# Patient Record
Sex: FEMALE | Race: BLACK OR AFRICAN AMERICAN | Employment: FULL TIME | URBAN - METROPOLITAN AREA
[De-identification: names, ages, dates, MRNs, and addresses within clinical notes are randomized per-mention and may not be internally consistent; named-entity substitution may affect disease eponyms.]

---

## 2018-07-18 ENCOUNTER — OFFICE VISIT (OUTPATIENT)
Dept: FAMILY MEDICINE CLINIC | Age: 23
End: 2018-07-18

## 2018-07-18 VITALS
OXYGEN SATURATION: 100 % | DIASTOLIC BLOOD PRESSURE: 75 MMHG | RESPIRATION RATE: 16 BRPM | TEMPERATURE: 97.7 F | BODY MASS INDEX: 19.12 KG/M2 | WEIGHT: 112 LBS | HEIGHT: 64 IN | SYSTOLIC BLOOD PRESSURE: 101 MMHG | HEART RATE: 78 BPM

## 2018-07-18 DIAGNOSIS — Z00.00 ROUTINE GENERAL MEDICAL EXAMINATION AT A HEALTH CARE FACILITY: Primary | ICD-10-CM

## 2018-07-18 NOTE — PROGRESS NOTES
Chief Complaint   Patient presents with    Physical         HPI:    This is a 21 y/o  patient who presents for routine physical. She want pertinent lab, screening test and immunizations for age. She has no unusual complain except for poor appetite- she confirms she works night and now in the habit of sleeping after work without eating. ROS: Pt denies: Wt loss, Fever/Chills, HA, Visual changes, Fatigue, Chest pain, SOB, MACHADO, Abd pain, N/V/D/C, Blood in stool or urine, Edema. Pertinent positive as above in HPI. All others were negative        No past medical history on file. Past Surgical History:   Procedure Laterality Date    VASCULAR SURGERY PROCEDURE UNLIST      Neck --lac repair as a baby       Family History   Problem Relation Age of Onset    No Known Problems Mother     No Known Problems Father     No Known Problems Sister     No Known Problems Brother     Diabetes Maternal Grandmother          Social History     Social History    Marital status: SINGLE     Spouse name: N/A    Number of children: N/A    Years of education: N/A     Occupational History    Not on file. Social History Main Topics    Smoking status: Never Smoker    Smokeless tobacco: Never Used    Alcohol use Yes      Comment: occasionally    Drug use: No    Sexual activity: Yes     Partners: Male     Birth control/ protection: IUD     Other Topics Concern    Not on file     Social History Narrative       Current Outpatient Prescriptions   Medication Sig Dispense Refill    ergocalciferol (ERGOCALCIFEROL) 50,000 unit capsule Take 1 Cap by mouth every seven (7) days.  12 Cap 1    cyclobenzaprine (FLEXERIL) 10 mg tablet   0         No Known Allergies        Physical Exam:    Vital Signs:   Visit Vitals    /75    Pulse 78    Temp 97.7 °F (36.5 °C) (Oral)    Resp 16    Ht 5' 4\" (1.626 m)    Wt 112 lb (50.8 kg)    SpO2 100%    BMI 19.22 kg/m2         General: a, a & o x 3, afebrile, interacting appropriately, in no acute distress  HEENT: head normocephalic and atraumatic, conjuctiva clear  Skin: warm and dry, no rashes , no bruises, no skin lesions  Neck: supple, symmetrical, no palpable mass, no thyromegaly  Respiratory: lung sounds clear bilaterally, good air entry, no wheezes or crackles  Cardiovascular: normal S1S2, regular rate and rhythm, no murmurs  Abdomen: non-distended, normal bowel sounds x 4 quadrants, soft, non-tender to palpation  Musculoskeletal: normal ROM on all joints, no swelling or deformity  Psychiatric: a, a & o x 3, appropriate mood and affect, no thought disorder      Assessment/Plan:      ICD-10-CM ICD-9-CM    1. Routine general medical examination at a health care facility Z00.00 V70.0 CBC WITH AUTOMATED DIFF      TSH 3RD GENERATION      LIPID PANEL      VITAMIN D, 25 HYDROXY      METABOLIC PANEL, COMPREHENSIVE      T4, FREE      HEMOGLOBIN A1C WITH EAG      CBC WITH AUTOMATED DIFF      TSH 3RD GENERATION      LIPID PANEL      VITAMIN D, 25 HYDROXY      METABOLIC PANEL, COMPREHENSIVE      T4, FREE      HEMOGLOBIN A1C WITH EAG         Additional Notes: Discussed today's diagnosis, treatment plans. Discussed medication indications and side effects. After Visit Summary: Provided and discussed printed patient instructions. Answered questions in relation to today's diagnosis.   Follow-up Disposition:  1 year CPE          Konstantin Palmer NP-BC  Family Medicine  [facility]          Orders Placed This Encounter    CBC WITH AUTOMATED DIFF    TSH 3RD GENERATION    LIPID PANEL    VITAMIN D, 25 HYDROXY    METABOLIC PANEL, COMPREHENSIVE    T4, FREE    HEMOGLOBIN A1C WITH EAG    HEMOGLOBIN A1C W/O EAG

## 2018-07-18 NOTE — PROGRESS NOTES
1. Have you been to the ER, urgent care clinic since your last visit? Hospitalized since your last visit? No    2. Have you seen or consulted any other health care providers outside of the [address] [place] [address] since your last visit? Include any pap smears or colon screening. Yes; OBGYN  [id number] pap smear with Dr. Dariel Veliz.     Chief Complaint   Patient presents with    Physical

## 2018-07-18 NOTE — PATIENT INSTRUCTIONS

## 2018-07-18 NOTE — MR AVS SNAPSHOT
[address] [facility] [address] [facility] [address] [id number] 
[phone] Patient: Lisbeth Myers MRN: [id number] :1995 Visit Information Date & Time Provider Department Dept. Phone Encounter #  
 2018 12:30 PM Phillip Coleman  [facility] [id number] Follow-up Instructions Return in about 1 year (around 2019) for Routine physical.  
  
Upcoming Health Maintenance Date Due  
 HPV Age 9Y-34Y (2 of 1 - Female 3 Dose Series) 2019* DTaP/Tdap/Td series (2 - Tdap) 2019* Influenza Age 5 to Adult 2018 PAP AKA CERVICAL CYTOLOGY 2019 *Topic was postponed. The date shown is not the original due date. Allergies as of 2018  Review Complete On: 2018 By: Syed Burrows No Known Allergies Current Immunizations  Never Reviewed No immunizations on file. Not reviewed this visit You Were Diagnosed With   
  
 Codes Comments Routine general medical examination at a health care facility    -  Primary ICD-10-CM: Z00.00 ICD-9-CM: V70.0 Vitals BP Pulse Temp Resp Height(growth percentile) Weight(growth percentile) 101/75 78 97.7 °F (36.5 °C) (Oral) 16 5' 4\" (1.626 m) 112 lb (50.8 kg) SpO2 BMI OB Status Smoking Status 100% 19.22 kg/m2 IUD Never Smoker Vitals History BMI and BSA Data Body Mass Index Body Surface Area  
 19.22 kg/m 2 1.51 m 2 Preferred Pharmacy Pharmacy Name Phone [facility] [phone] Your Updated Medication List  
  
   
This list is accurate as of 18  1:54 PM.  Always use your most recent med list.  
  
  
  
  
 cyclobenzaprine 10 mg tablet Commonly known as:  FLEXERIL  
  
 ergocalciferol 50,000 unit capsule Commonly known as:  ERGOCALCIFEROL Take 1 Cap by mouth every seven (7) days. Follow-up Instructions Return in about 1 year (around 7/18/2019) for Routine physical.  
  
To-Do List   
 07/18/2018 Lab:  CBC WITH AUTOMATED DIFF   
  
 07/18/2018 Lab:  HEMOGLOBIN A1C WITH EAG   
  
 07/18/2018 Lab:  LIPID PANEL   
  
 07/18/2018 Lab:  METABOLIC PANEL, COMPREHENSIVE   
  
 07/18/2018 Lab:  T4, FREE   
  
 07/18/2018 Lab:  TSH 3RD GENERATION   
  
 07/18/2018 Lab:  VITAMIN D, 25 HYDROXY Patient Instructions Well Visit, Ages 25 to 48: Care Instructions Your Care Instructions Physical exams can help you stay healthy. Your doctor has checked your overall health and may have suggested ways to take good care of yourself. He or she also may have recommended tests. At home, you can help prevent illness with healthy eating, regular exercise, and other steps. Follow-up care is a key part of your treatment and safety. Be sure to make and go to all appointments, and call your doctor if you are having problems. It's also a good idea to know your test results and keep a list of the medicines you take. How can you care for yourself at home? · Reach and stay at a healthy weight. This will lower your risk for many problems, such as obesity, diabetes, heart disease, and high blood pressure. · Get at least 30 minutes of physical activity on most days of the week. Walking is a good choice. You also may want to do other activities, such as running, swimming, cycling, or playing tennis or team sports. Discuss any changes in your exercise program with your doctor. · Do not smoke or allow others to smoke around you. If you need help quitting, talk to your doctor about stop-smoking programs and medicines. These can increase your chances of quitting for good. · Talk to your doctor about whether you have any risk factors for sexually transmitted infections (STIs).  Having one sex partner (who does not have STIs and does not have sex with anyone else) is a good way to avoid these infections. · Use birth control if you do not want to have children at this time. Talk with your doctor about the choices available and what might be best for you. · Protect your skin from too much sun. When you're outdoors from 10 a.m. to 4 p.m., stay in the shade or cover up with clothing and a hat with a wide brim. Wear sunglasses that block UV rays. Even when it's cloudy, put broad-spectrum sunscreen (SPF 30 or higher) on any exposed skin. · See a dentist one or two times a year for checkups and to have your teeth cleaned. · Wear a seat belt in the car. · Drink alcohol in moderation, if at all. That means no more than 2 drinks a day for men and 1 drink a day for women. Follow your doctor's advice about when to have certain tests. These tests can spot problems early. For everyone · Cholesterol. Have the fat (cholesterol) in your blood tested after age 21. Your doctor will tell you how often to have this done based on your age, family history, or other things that can increase your risk for heart disease. · Blood pressure. Have your blood pressure checked during a routine doctor visit. Your doctor will tell you how often to check your blood pressure based on your age, your blood pressure results, and other factors. · Vision. Talk with your doctor about how often to have a glaucoma test. 
· Diabetes. Ask your doctor whether you should have tests for diabetes. · Colon cancer. Have a test for colon cancer at age 48. You may have one of several tests. If you are younger than 48, you may need a test earlier if you have any risk factors. Risk factors include whether you already had a precancerous polyp removed from your colon or whether your parent, brother, sister, or child has had colon cancer. For women · Breast exam and mammogram. Talk to your doctor about when you should have a clinical breast exam and a mammogram. Medical experts differ on whether and how often women under 50 should have these tests. Your doctor can help you decide what is right for you. · Pap test and pelvic exam. Begin Pap tests at age 24. A Pap test is the best way to find cervical cancer. The test often is part of a pelvic exam. Ask how often to have this test. 
· Tests for sexually transmitted infections (STIs). Ask whether you should have tests for STIs. You may be at risk if you have sex with more than one person, especially if your partners do not wear condoms. For men · Tests for sexually transmitted infections (STIs). Ask whether you should have tests for STIs. You may be at risk if you have sex with more than one person, especially if you do not wear a condom. · Testicular cancer exam. Ask your doctor whether you should check your testicles regularly. · Prostate exam. Talk to your doctor about whether you should have a blood test (called a PSA test) for prostate cancer. Experts differ on whether and when men should have this test. Some experts suggest it if you are older than 39 and are -American or have a father or brother who got prostate cancer when he was younger than 72. When should you call for help? Watch closely for changes in your health, and be sure to contact your doctor if you have any problems or symptoms that concern you. Where can you learn more? Go to http://marlin-rebecca.info/. Enter P072 in the search box to learn more about \"Well Visit, Ages 25 to 48: Care Instructions. \" Current as of: May 16, 2017 Content Version: 11.7 © 3639-3509 Healthwise, Incorporated. Care instructions adapted under license by [employer] (which disclaims liability or warranty for this information).  If you have questions about a medical condition or this instruction, always ask your healthcare professional. [employer] disclaims any warranty or liability for your use of this information. Introducing [facility] & HEALTH SERVICES! Dear Deepak Wick: Thank you for requesting a [employer] account. Our records indicate that you have previously registered for a [employer] account but its currently inactive. Please call our [employer] support line at [phone]. Additional Information If you have questions, please visit the Frequently Asked Questions section of the [employer] website at [email or web address] Wirelesst/. Remember, [employer] is NOT to be used for urgent needs. For medical emergencies, dial 911. Now available from your iPhone and Android! Please provide this summary of care documentation to your next provider. Your primary care clinician is listed as Sarah Vines. If you have any questions after today's visit, please call [phone].

## 2018-07-19 LAB
25(OH)D3 SERPL-MCNC: 24.8 NG/ML (ref 32–100)
A-G RATIO,AGRAT: 1.8 RATIO (ref 1.1–2.6)
ABSOLUTE LYMPHOCYTE COUNT, 10803: 1.9 K/UL (ref 1–4.8)
ALBUMIN SERPL-MCNC: 4.7 G/DL (ref 3.5–5)
ALP SERPL-CCNC: 27 U/L (ref 25–115)
ALT SERPL-CCNC: 14 U/L (ref 5–40)
ANION GAP SERPL CALC-SCNC: 15 MMOL/L
AST SERPL W P-5'-P-CCNC: 14 U/L (ref 10–37)
AVG GLU, 10930: 94 MG/DL (ref 91–123)
BASOPHILS # BLD: 0 K/UL (ref 0–0.2)
BASOPHILS NFR BLD: 1 % (ref 0–2)
BILIRUB SERPL-MCNC: 0.5 MG/DL (ref 0.2–1.2)
BUN SERPL-MCNC: 13 MG/DL (ref 6–22)
CALCIUM SERPL-MCNC: 9.3 MG/DL (ref 8.4–10.5)
CHLORIDE SERPL-SCNC: 98 MMOL/L (ref 98–110)
CHOLEST SERPL-MCNC: 171 MG/DL (ref 110–200)
CO2 SERPL-SCNC: 27 MMOL/L (ref 20–32)
CREAT SERPL-MCNC: 0.7 MG/DL (ref 0.5–1.2)
EOSINOPHIL # BLD: 0.1 K/UL (ref 0–0.5)
EOSINOPHIL NFR BLD: 2 % (ref 0–6)
ERYTHROCYTE [DISTWIDTH] IN BLOOD BY AUTOMATED COUNT: 12.5 % (ref 10–15.5)
GFRAA, 66117: >60
GFRNA, 66118: >60
GLOBULIN,GLOB: 2.6 G/DL (ref 2–4)
GLUCOSE SERPL-MCNC: 71 MG/DL (ref 70–99)
GRANULOCYTES,GRANS: 48 % (ref 40–75)
HBA1C MFR BLD HPLC: 4.9 % (ref 4.8–5.9)
HCT VFR BLD AUTO: 38.3 % (ref 35.1–46.5)
HDLC SERPL-MCNC: 2.5 MG/DL (ref 0–5)
HDLC SERPL-MCNC: 68 MG/DL (ref 40–59)
HGB BLD-MCNC: 12.5 G/DL (ref 11.7–15.5)
LDLC SERPL CALC-MCNC: 93 MG/DL (ref 50–99)
LYMPHOCYTES, LYMLT: 42 % (ref 20–45)
MCH RBC QN AUTO: 32 PG (ref 26–34)
MCHC RBC AUTO-ENTMCNC: 33 G/DL (ref 31–36)
MCV RBC AUTO: 99 FL (ref 80–95)
MONOCYTES # BLD: 0.4 K/UL (ref 0.1–1)
MONOCYTES NFR BLD: 8 % (ref 3–12)
NEUTROPHILS # BLD AUTO: 2.2 K/UL (ref 1.8–7.7)
PLATELET # BLD AUTO: 293 K/UL (ref 140–440)
PMV BLD AUTO: 9.8 FL (ref 9–13)
POTASSIUM SERPL-SCNC: 4 MMOL/L (ref 3.5–5.5)
PROT SERPL-MCNC: 7.3 G/DL (ref 6.4–8.3)
RBC # BLD AUTO: 3.86 M/UL (ref 3.8–5.2)
SODIUM SERPL-SCNC: 140 MMOL/L (ref 133–145)
T4 FREE SERPL-MCNC: 1.6 NG/DL (ref 0.9–1.8)
TRIGL SERPL-MCNC: 54 MG/DL (ref 40–149)
TSH SERPL DL<=0.005 MIU/L-ACNC: 0.47 MCU/ML (ref 0.27–4.2)
VLDLC SERPL CALC-MCNC: 11 MG/DL (ref 8–30)
WBC # BLD AUTO: 4.6 K/UL (ref 4–11)

## 2019-10-24 ENCOUNTER — HOSPITAL ENCOUNTER (OUTPATIENT)
Dept: LAB | Age: 24
Discharge: HOME | End: 2019-10-24
Payer: MEDICAID

## 2019-10-24 ENCOUNTER — OFFICE VISIT (OUTPATIENT)
Dept: FAMILY MEDICINE CLINIC | Age: 24
End: 2019-10-24

## 2019-10-24 VITALS
DIASTOLIC BLOOD PRESSURE: 68 MMHG | OXYGEN SATURATION: 100 % | TEMPERATURE: 97.8 F | HEART RATE: 86 BPM | BODY MASS INDEX: 21 KG/M2 | RESPIRATION RATE: 16 BRPM | SYSTOLIC BLOOD PRESSURE: 114 MMHG | HEIGHT: 64 IN | WEIGHT: 123 LBS

## 2019-10-24 DIAGNOSIS — Z11.3 SCREEN FOR STD (SEXUALLY TRANSMITTED DISEASE): ICD-10-CM

## 2019-10-24 DIAGNOSIS — Z12.4 SCREENING FOR MALIGNANT NEOPLASM OF CERVIX: ICD-10-CM

## 2019-10-24 DIAGNOSIS — Z12.39 SCREENING FOR MALIGNANT NEOPLASM OF BREAST: ICD-10-CM

## 2019-10-24 DIAGNOSIS — Z30.431 SURVEILLANCE OF PREVIOUSLY PRESCRIBED INTRAUTERINE CONTRACEPTIVE DEVICE: Primary | ICD-10-CM

## 2019-10-24 DIAGNOSIS — E55.9 VITAMIN D DEFICIENCY: ICD-10-CM

## 2019-10-24 PROCEDURE — 87661 TRICHOMONAS VAGINALIS AMPLIF: CPT

## 2019-10-24 PROCEDURE — 87624 HPV HI-RISK TYP POOLED RSLT: CPT

## 2019-10-24 PROCEDURE — 88142 CYTOPATH C/V THIN LAYER: CPT

## 2019-10-24 NOTE — PROGRESS NOTES
Subjective:   30 y.o. female for Well Woman Check. No LMP recorded (approximate). (Menstrual status: IUD). Social History: multiple partners, contraception - condoms and IUD. Condoms sometimes  Pertinent past medical hstory: no history of HTN, DVT, CAD, DM, liver disease, migraines or smoking. There is no problem list on file for this patient. There are no active problems to display for this patient. Current Outpatient Medications   Medication Sig Dispense Refill    ergocalciferol (ERGOCALCIFEROL) 50,000 unit capsule Take 1 Cap by mouth every seven (7) days. 12 Cap 1    cyclobenzaprine (FLEXERIL) 10 mg tablet   0     No Known Allergies  History reviewed. No pertinent past medical history. Past Surgical History:   Procedure Laterality Date    VASCULAR SURGERY PROCEDURE UNLIST      Neck --lac repair as a baby     Family History   Problem Relation Age of Onset    No Known Problems Mother     No Known Problems Father     No Known Problems Sister     No Known Problems Brother     Diabetes Maternal Grandmother      Social History     Tobacco Use    Smoking status: Never Smoker    Smokeless tobacco: Never Used   Substance Use Topics    Alcohol use: Yes     Comment: occasionally        ROS:  Feeling well. No dyspnea or chest pain on exertion. No abdominal pain, change in bowel habits, black or bloody stools. No urinary tract symptoms. GYN ROS: no breast pain or new or enlarging lumps on self exam, she complains of brown vaginal discharge for the past 2 weeks. No neurological complaints. Denies any pelvic pain or discomfort, denies any abnormal uterine bleeding that could be associated with displacement of her IUD.       Objective:     Visit Vitals  /68 (BP 1 Location: Right arm, BP Patient Position: Sitting)   Pulse 86   Temp 97.8 °F (36.6 °C) (Oral)   Resp 16   Ht 5' 4\" (1.626 m)   Wt 123 lb (55.8 kg)   LMP  (Approximate)   SpO2 100%   BMI 21.11 kg/m²     The patient appears well, alert, oriented x 3, in no distress. ENT normal.  Neck supple. No adenopathy or thyromegaly. KATE. Lungs are clear, good air entry, no wheezes, rhonchi or rales. S1 and S2 normal, no murmurs, regular rate and rhythm. Abdomen soft without tenderness, guarding, mass or organomegaly. Extremities show no edema, normal peripheral pulses. Neurological is normal, no focal findings. BREAST EXAM: breasts appear normal, no suspicious masses, no skin or nipple changes or axillary nodes    PELVIC EXAM: normal external genitalia, vulva, vagina, cervix, uterus and adnexa    Assessment/Plan:   well woman  pap smear  counseled on breast self exam, STD prevention, HIV risk factors and prevention and family planning choices  return annually or prn    ICD-10-CM ICD-9-CM    1. Well woman exam with routine gynecological exam Z01.419 V72.31     [V72.31]   2. Screening for malignant neoplasm of breast Z12.39 V76.10    3. Vitamin D deficiency E55.9 268.9    4. Screen for STD (sexually transmitted disease) Z11.3 V74.5 CANCELED: CHLAMYDIA/NEISSERIA/TRICHOMONAS AMP   5. Screening for malignant neoplasm of cervix Z12.4 V76.2 PAP + HPV DNA (HIGH RISK)   6. Surveillance of previously prescribed intrauterine contraceptive device Z30.431 V25.42 PAP + HPV DNA (HIGH RISK)     Here today for her annual well woman and surveillance of her indwelling IUD  Cervical specimen obtained and sent to the lab for examination to rule out HPV and possible cancer  She denies any signs and symptoms associated with possible dislodgment of her IUD  Screening done today for STDs as part of her exam    An After Visit Summary was printed and given to the patient. All diagnosis have been discussed with the patient and all of the patient's questions have been answered. This note was dictated using Dragon dictation system.  Every effort was made to ensure accuracy, although occasional spelling errors and word substitutions are expected due to dictation system limitations. Thank you for your understanding and patient. Follow-up and Dispositions    · Return in about 1 year (around 10/24/2020) for Annual physical, w/o gyn, 30 minutes. DEBBIE Leger-[address] [place].   Rosales Mcclure

## 2019-10-24 NOTE — PATIENT INSTRUCTIONS
Well Visit, Ages 25 to 48: Care Instructions  Your Care Instructions    Physical exams can help you stay healthy. Your doctor has checked your overall health and may have suggested ways to take good care of yourself. He or she also may have recommended tests. At home, you can help prevent illness with healthy eating, regular exercise, and other steps. Follow-up care is a key part of your treatment and safety. Be sure to make and go to all appointments, and call your doctor if you are having problems. It's also a good idea to know your test results and keep a list of the medicines you take. How can you care for yourself at home? · Reach and stay at a healthy weight. This will lower your risk for many problems, such as obesity, diabetes, heart disease, and high blood pressure. · Get at least 30 minutes of physical activity on most days of the week. Walking is a good choice. You also may want to do other activities, such as running, swimming, cycling, or playing tennis or team sports. Discuss any changes in your exercise program with your doctor. · Do not smoke or allow others to smoke around you. If you need help quitting, talk to your doctor about stop-smoking programs and medicines. These can increase your chances of quitting for good. · Talk to your doctor about whether you have any risk factors for sexually transmitted infections (STIs). Having one sex partner (who does not have STIs and does not have sex with anyone else) is a good way to avoid these infections. · Use birth control if you do not want to have children at this time. Talk with your doctor about the choices available and what might be best for you. · Protect your skin from too much sun. When you're outdoors from 10 a.m. to 4 p.m., stay in the shade or cover up with clothing and a hat with a wide brim. Wear sunglasses that block UV rays. Even when it's cloudy, put broad-spectrum sunscreen (SPF 30 or higher) on any exposed skin.   · See a dentist one or two times a year for checkups and to have your teeth cleaned. · Wear a seat belt in the car. Follow your doctor's advice about when to have certain tests. These tests can spot problems early. For everyone  · Cholesterol. Have the fat (cholesterol) in your blood tested after age 21. Your doctor will tell you how often to have this done based on your age, family history, or other things that can increase your risk for heart disease. · Blood pressure. Have your blood pressure checked during a routine doctor visit. Your doctor will tell you how often to check your blood pressure based on your age, your blood pressure results, and other factors. · Vision. Talk with your doctor about how often to have a glaucoma test.  · Diabetes. Ask your doctor whether you should have tests for diabetes. · Colon cancer. Your risk for colorectal cancer gets higher as you get older. Some experts say that adults should start regular screening at age 48 and stop at age 76. Others say to start before age 48 or continue after age 76. Talk with your doctor about your risk and when to start and stop screening. For women  · Breast exam and mammogram. Talk to your doctor about when you should have a clinical breast exam and a mammogram. Medical experts differ on whether and how often women under 50 should have these tests. Your doctor can help you decide what is right for you. · Cervical cancer screening test and pelvic exam. Begin with a Pap test at age 24. The test often is part of a pelvic exam. Starting at age 27, you may choose to have a Pap test, an HPV test, or both tests at the same time (called co-testing). Talk with your doctor about how often to have testing. · Tests for sexually transmitted infections (STIs). Ask whether you should have tests for STIs. You may be at risk if you have sex with more than one person, especially if your partners do not wear condoms.   For men  · Tests for sexually transmitted infections (STIs). Ask whether you should have tests for STIs. You may be at risk if you have sex with more than one person, especially if you do not wear a condom. · Testicular cancer exam. Ask your doctor whether you should check your testicles regularly. · Prostate exam. Talk to your doctor about whether you should have a blood test (called a PSA test) for prostate cancer. Experts differ on whether and when men should have this test. Some experts suggest it if you are older than 39 and are -American or have a father or brother who got prostate cancer when he was younger than 72. When should you call for help? Watch closely for changes in your health, and be sure to contact your doctor if you have any problems or symptoms that concern you. Where can you learn more? Go to http://marlin-rebecca.info/. Enter P072 in the search box to learn more about \"Well Visit, Ages 25 to 48: Care Instructions. \"  Current as of: December 13, 2018  Content Version: 12.2  © 8624-7808 [employer], Incorporated. Care instructions adapted under license by [employer] (which disclaims liability or warranty for this information). If you have questions about a medical condition or this instruction, always ask your healthcare professional. [place] [address] any warranty or liability for your use of this information.

## 2019-10-24 NOTE — PROGRESS NOTES
ROOM # 9  Identified pt with two pt identifiers(name and ). Reviewed record in preparation for visit and have obtained necessary documentation. Chief Complaint   Patient presents with    Well Woman    Vaginal Discharge     brown x 2 wks       Steven Ga preferred language for health care discussion is english/other. Is the patient using any DME equipment during OV? Jacquie Thayer is due for:  Health Maintenance Due   Topic    DTaP/Tdap/Td series (2 - Tdap)    PAP AKA CERVICAL CYTOLOGY     Influenza Age 5 to Adult      Health Maintenance reviewed and discussed per provider  Please order/place referral if appropriate. Advance Directive:  1. Do you have an advance directive in place? Patient Reply: NO    2. If not, would you like material regarding how to put one in place? NO    Coordination of Care:  1. Have you been to the ER, urgent care clinic since your last visit? Hospitalized since your last visit? NO    2. Have you seen or consulted any other health care providers outside of the [address] [place] [address] since your last visit? Include any pap smears or colon screening. NO    Patient is accompanied by self I have received verbal consent from Steven Ga to discuss any/all medical information while they are present in the room. Learning Assessment:  Learning Assessment 2016   PRIMARY LEARNER Patient   HIGHEST LEVEL OF EDUCATION - PRIMARY LEARNER  [employer]   BARRIERS PRIMARY LEARNER NONE   PRIMARY LANGUAGE ENGLISH   LEARNER PREFERENCE PRIMARY DEMONSTRATION   ANSWERED BY self   RELATIONSHIP SELF     Depression Screening:  3 most recent PHQ Screens 2018   Little interest or pleasure in doing things Not at all Not at all   Feeling down, depressed, irritable, or hopeless Not at all Not at all   Total Score PHQ 2 0 0     Abuse Screening:  Abuse Screening Questionnaire 2016   Do you ever feel afraid of your partner?  N   Are you in a relationship with someone who physically or mentally threatens you? N   Is it safe for you to go home?  Y     Fall Risk  n/i

## 2019-10-28 ENCOUNTER — TELEPHONE (OUTPATIENT)
Dept: FAMILY MEDICINE CLINIC | Age: 24
End: 2019-10-28

## 2019-10-28 LAB
C TRACH RRNA SPEC QL NAA+PROBE: NEGATIVE
N GONORRHOEA RRNA SPEC QL NAA+PROBE: NEGATIVE
SPECIMEN SOURCE: NORMAL
T VAGINALIS RRNA SPEC QL NAA+PROBE: NEGATIVE

## 2019-10-28 NOTE — TELEPHONE ENCOUNTER
Thor Marcial from [facility] is in receipt of PAP specimen for this patient. However due to the patient's insurance, ([employer])  the wrong Daig Code was noted. Please call Thor Marcial at [phone].

## 2020-02-17 ENCOUNTER — OFFICE VISIT (OUTPATIENT)
Dept: FAMILY MEDICINE CLINIC | Age: 25
End: 2020-02-17

## 2020-02-17 VITALS
TEMPERATURE: 98.5 F | HEIGHT: 64 IN | WEIGHT: 129 LBS | RESPIRATION RATE: 18 BRPM | SYSTOLIC BLOOD PRESSURE: 133 MMHG | HEART RATE: 110 BPM | OXYGEN SATURATION: 100 % | BODY MASS INDEX: 22.02 KG/M2 | DIASTOLIC BLOOD PRESSURE: 75 MMHG

## 2020-02-17 DIAGNOSIS — R63.4 WEIGHT LOSS, UNINTENTIONAL: Primary | ICD-10-CM

## 2020-02-17 DIAGNOSIS — Z02.1 ENCOUNTER FOR PRE-EMPLOYMENT EXAMINATION: ICD-10-CM

## 2020-02-17 NOTE — PROGRESS NOTES
1. Have you been to the ER, urgent care clinic since your last visit? Hospitalized since your last visit? No.     2. Have you seen or consulted any other health care providers outside of the [address] [place] [address] since your last visit? Include any pap smears or colon screening. No.     Chief Complaint   Patient presents with    Complete Physical     Form Completion for her [address].

## 2020-02-17 NOTE — PROGRESS NOTES
[address] [place] [address]               [phone]      Pricila Wisdom is a 30 y.o. female and presents with Complete Physical (Form Completion for her [address]. )       Assessment/Plan:    Diagnoses and all orders for this visit:    1. Weight loss, unintentional  -     T4, FREE; Future  -     T3 TOTAL; Future  -     METABOLIC PANEL, BASIC; Future    2. Encounter for pre-employment examination    Here today for preemployment examination, forms completed and copy put in chart under media    She also endorses she would like to gain some weight however feels as though she is having difficulty with it, she asked me to order some medications that she had gotten from [place] however I am unfamiliar with it and would not order it for her, she verbalized understanding  We will check thyroid function and metabolic panel and continue work-up as dictated      Follow-up and Dispositions    · Return in about 8 months (around 10/17/2020) for Annual physical, w/o gyn, 30 minutes. Subjective:    Here today for physical evaluation for the [address] to work as a [employer]      ROS:     Review of Systems   Constitutional: Negative. HENT: Negative. Respiratory: Negative. Cardiovascular: Negative. Gastrointestinal: Negative. Genitourinary: Negative. Musculoskeletal: Negative. Skin: Negative. Neurological: Negative. Psychiatric/Behavioral: Negative. The problem list was updated as a part of today's visit. There is no problem list on file for this patient. The PSH, FH were reviewed.       SH:  Social History     Tobacco Use    Smoking status: Never Smoker    Smokeless tobacco: Never Used   Substance Use Topics    Alcohol use: Yes     Comment: occasionally    Drug use: No       Medications/Allergies:  Current Outpatient Medications on File Prior to Visit   Medication Sig Dispense Refill    levonorgestreL (MIRENA) 20 mcg/24 hours (5 yrs) 52 mg IUD 1 Device by IntraUTERine route once.  benzoyl peroxide 10 % clsr topical cleanser apply to affected area IN GROIN 3 TO 5 TIMES PER WEEK      clindamycin-benzoyl peroxide (BENZACLIN) 1-5 % topical gel apply A PEA SIZED AMOUNT to face every morning      tretinoin (RETIN-A) 0.05 % topical cream apply A PEA SIZED AMOUNT to face 3 TO 4 TIMES A WEEK      [DISCONTINUED] ergocalciferol (ERGOCALCIFEROL) 50,000 unit capsule Take 1 Cap by mouth every seven (7) days. 12 Cap 1    [DISCONTINUED] cyclobenzaprine (FLEXERIL) 10 mg tablet   0     No current facility-administered medications on file prior to visit. No Known Allergies    Objective:  Visit Vitals  /75   Pulse (!) 110   Temp 98.5 °F (36.9 °C) (Oral)   Resp 18   Ht 5' 4\" (1.626 m)   Wt 129 lb (58.5 kg)   SpO2 100%   BMI 22.14 kg/m²    Body mass index is 22.14 kg/m². Physical assessment  Physical Exam  Vitals signs and nursing note reviewed. Constitutional:       Appearance: Normal appearance. HENT:      Head: Normocephalic and atraumatic. Right Ear: Hearing, tympanic membrane, ear canal and external ear normal.      Left Ear: Hearing, tympanic membrane, ear canal and external ear normal.      Nose: Nose normal.      Mouth/Throat:      Pharynx: Uvula midline. Eyes:      General: Lids are normal.      Conjunctiva/sclera: Conjunctivae normal.      Pupils: Pupils are equal, round, and reactive to light. Neck:      Musculoskeletal: Normal range of motion and neck supple. Thyroid: No thyroid mass or thyromegaly. Vascular: No carotid bruit or JVD. Trachea: Trachea normal. No tracheal deviation. Cardiovascular:      Rate and Rhythm: Normal rate and regular rhythm. Heart sounds: Normal heart sounds, S1 normal and S2 normal.   Pulmonary:      Effort: Pulmonary effort is normal.      Breath sounds: Normal breath sounds. Abdominal:      General: Bowel sounds are normal.      Palpations: Abdomen is soft. Tenderness: There is no abdominal tenderness. Musculoskeletal: Normal range of motion. Lymphadenopathy:      Head:      Right side of head: No submental, submandibular, tonsillar, preauricular, posterior auricular or occipital adenopathy. Left side of head: No submental, submandibular, tonsillar, preauricular, posterior auricular or occipital adenopathy. Cervical: No cervical adenopathy. Skin:     General: Skin is warm and dry. Neurological:      Mental Status: She is alert and oriented to person, place, and time. Motor: Motor function is intact. Gait: Gait is intact.    Psychiatric:         Mood and Affect: Mood and affect normal.         Cognition and Memory: Memory normal.         Judgment: Judgment normal.           Labwork and Ancillary Studies:    CBC w/Diff  Lab Results   Component Value Date/Time    WBC 4.6 07/18/2018 01:57 PM    HGB 12.5 07/18/2018 01:57 PM    PLATELET 291 19/82/9030 01:57 PM         Basic Metabolic Profile  Lab Results   Component Value Date/Time    Sodium 142 02/17/2020 01:15 PM    Potassium 3.8 02/17/2020 01:15 PM    Chloride 104 02/17/2020 01:15 PM    CO2 24 02/17/2020 01:15 PM    Anion gap 14.0 02/17/2020 01:15 PM    Glucose 83 02/17/2020 01:15 PM    BUN 12 02/17/2020 01:15 PM    Creatinine 0.6 02/17/2020 01:15 PM    BUN/Creatinine ratio 14 06/17/2016 12:20 PM    GFR est  06/17/2016 12:20 PM    GFR est non- 06/17/2016 12:20 PM    Calcium 9.2 02/17/2020 01:15 PM        Cholesterol  Lab Results   Component Value Date/Time    Cholesterol, total 171 07/18/2018 01:57 PM    HDL Cholesterol 68 (H) 07/18/2018 01:57 PM    LDL, calculated 93 07/18/2018 01:57 PM    Triglyceride 54 07/18/2018 01:57 PM       Health Maintenance:   Health Maintenance   Topic Date Due    Influenza Age 5 to Adult  08/01/2019    PAP AKA CERVICAL CYTOLOGY  10/24/2022    DTaP/Tdap/Td series (3 - Td) 03/20/2024    HPV Age 9Y-34Y  Completed    Pneumococcal 0-64 years  Aged Out       I have discussed the diagnosis with the patient and the intended plan as seen in the above orders. The patient has received an After-Visit Summary and questions were answered concerning future plans. An After Visit Summary was printed and given to the patient. All diagnosis have been discussed with the patient and all of the patient's questions have been answered. Follow-up and Dispositions    · Return in about 8 months (around 10/17/2020) for Annual physical, w/o gyn, 30 minutes. Bre Stephens, [facility]  [address]  [id number]

## 2020-02-18 LAB
ANION GAP SERPL CALC-SCNC: 14 MMOL/L
BUN SERPL-MCNC: 12 MG/DL (ref 6–22)
CALCIUM SERPL-MCNC: 9.2 MG/DL (ref 8.4–10.5)
CHLORIDE SERPL-SCNC: 104 MMOL/L (ref 98–110)
CO2 SERPL-SCNC: 24 MMOL/L (ref 20–32)
CREAT SERPL-MCNC: 0.6 MG/DL (ref 0.5–1.2)
GFRAA, 66117: >60
GFRNA, 66118: >60
GLUCOSE SERPL-MCNC: 83 MG/DL (ref 70–99)
POTASSIUM SERPL-SCNC: 3.8 MMOL/L (ref 3.5–5.5)
SODIUM SERPL-SCNC: 142 MMOL/L (ref 133–145)
T3 TOTAL,T3LT: 114 NG/DL (ref 80–200)
T4 FREE SERPL-MCNC: 1.3 NG/DL (ref 0.9–1.8)

## 2020-02-23 RX ORDER — CLINDAMYCIN AND BENZOYL PEROXIDE 10; 50 MG/G; MG/G
GEL TOPICAL
COMMUNITY
Start: 2020-02-03 | End: 2022-05-04

## 2020-02-23 RX ORDER — TRETINOIN 0.5 MG/G
CREAM TOPICAL
COMMUNITY
Start: 2020-02-03 | End: 2022-05-04

## 2020-02-23 RX ORDER — BENZOYL PEROXIDE 100 MG/ML
LIQUID TOPICAL
COMMUNITY
Start: 2020-02-03 | End: 2022-05-04

## 2020-03-10 DIAGNOSIS — Z01.419 ENCOUNTER FOR PHYSICAL EXAMINATION, CONTRACEPTION, AND PAPANICOLAOU SMEAR: Primary | ICD-10-CM

## 2020-03-10 DIAGNOSIS — Z30.9 ENCOUNTER FOR PHYSICAL EXAMINATION, CONTRACEPTION, AND PAPANICOLAOU SMEAR: Primary | ICD-10-CM

## 2022-05-04 ENCOUNTER — VIRTUAL VISIT (OUTPATIENT)
Dept: FAMILY MEDICINE CLINIC | Age: 27
End: 2022-05-04
Payer: COMMERCIAL

## 2022-05-04 DIAGNOSIS — F43.9 STRESS AT HOME: ICD-10-CM

## 2022-05-04 DIAGNOSIS — Z13.31 POSITIVE DEPRESSION SCREENING: ICD-10-CM

## 2022-05-04 DIAGNOSIS — F32.A ANXIETY AND DEPRESSION: Primary | ICD-10-CM

## 2022-05-04 DIAGNOSIS — F41.9 ANXIETY AND DEPRESSION: Primary | ICD-10-CM

## 2022-05-04 PROCEDURE — 99213 OFFICE O/P EST LOW 20 MIN: CPT | Performed by: NURSE PRACTITIONER

## 2022-05-04 NOTE — PROGRESS NOTES
[facility] on the 2nd due to      Did you take your medication today? Patient states I am not taking any medications besides Birth Control and Tylenol we needed      1. \"Have you been to the ER, urgent care clinic since your last visit? Hospitalized since your last visit? \" No    2. \"Have you seen or consulted any other health care providers outside of the [address] [place] [address] since your last visit? \" No     3. For patients aged 39-70: Has the patient had a colonoscopy / FIT/ Cologuard? NA - based on age      If the patient is female:    4. For patients aged 41-77: Has the patient had a mammogram within the past 2 years? NA - based on age or sex      11. For patients aged 21-65: Has the patient had a pap smear? Yes - no Care Gap present Patient states I go to  [facility] for pap . Patient states the last time I been was May of last year.           3 most recent PHQ Screens 5/4/2022   Little interest or pleasure in doing things Nearly every day   Feeling down, depressed, irritable, or hopeless Nearly every day   Total Score PHQ 2 6   Trouble falling or staying asleep, or sleeping too much Several days   Feeling tired or having little energy Nearly every day   Poor appetite, weight loss, or overeating Nearly every day   Feeling bad about yourself - or that you are a failure or have let yourself or your family down Nearly every day   Trouble concentrating on things such as school, work, reading, or watching TV Not at all   Moving or speaking so slowly that other people could have noticed; or the opposite being so fidgety that others notice Not at all   Thoughts of being better off dead, or hurting yourself in some way Not at all   PHQ 9 Score 16         Health Maintenance Due   Topic Date Due    Hepatitis C Screening  Never done    Depression Monitoring  Never done    COVID-19 Vaccine (3 - Booster for Quintanilla Peter series) 11/10/2021       Learning Assessment 6/17/2016   PRIMARY LEARNER Patient   HIGHEST LEVEL OF EDUCATION - PRIMARY LEARNER  [employer]   BARRIERS PRIMARY LEARNER NONE   PRIMARY LANGUAGE ENGLISH   LEARNER PREFERENCE PRIMARY DEMONSTRATION   ANSWERED BY self   RELATIONSHIP SELF

## 2022-05-04 NOTE — PROGRESS NOTES
[address] [place] [address]               [phone]      Hedy Boone is a 27 y.o. female who was seen by synchronous (real-time) audio-video technology on 5/4/2022 for Depression        Assessment & Plan:   Diagnoses and all orders for this visit:    1. Anxiety and depression    2. Positive depression screening    3. Stress at home    Advised I recommend she sees a psychiatrist,   Requested she bring the paperwork to the office for me to review  Depression and anxiety related to her home life stressors at this time  She is working on finding a new caretaker for her children but there is much paperwork to be done in the process  And cannot be accomplished quickly  Follow up in one month   Follow-up and Dispositions    · Return in 4 weeks (on 6/1/2022) for depression, 15 min, office only. 712  Subjective:     Stress and depression  [place], is out of the country 6 months at a time  Was supposed to go to [employer] on the 2nd but found out her radha caretaker was spending her money  Her radha caretaker is her sisiter, upon confrontation of the situation an argument ensued and she does not have a caretaker now for her child  She was told by her job they may not be able to accommodate her. States she needs a medical form attesting to her mental health stating she is not fit for duty. Prior to Admission medications    Medication Sig Start Date End Date Taking? Authorizing Provider   levonorgestreL (MIRENA) 20 mcg/24 hours (5 yrs) 52 mg IUD 1 Device by IntraUTERine route once. Yes Provider, Historical     There is no problem list on file for this patient.     Past Surgical History:   Procedure Laterality Date    VASCULAR SURGERY PROCEDURE UNLIST      Neck --lac repair as a baby     Family History   Problem Relation Age of Onset    No Known Problems Mother     No Known Problems Father     No Known Problems Sister     No Known Problems Brother     Diabetes Maternal Grandmother      Social History     Tobacco Use    Smoking status: Never Smoker    Smokeless tobacco: Never Used   Substance Use Topics    Alcohol use: Yes     Comment: occasionally       ROS  As stated in HPI, otherwise all others negative. Objective:   No flowsheet data found. General: alert, cooperative, no distress   Mental  status: normal mood, behavior, speech, dress, motor activity, and thought processes, able to follow commands   HENT: NCAT   Neck: no visualized mass   Resp: no respiratory distress   Neuro: no gross deficits   Skin: no discoloration or lesions of concern on visible areas   Psychiatric: normal affect, consistent with stated mood, no evidence of hallucinations     Additional exam findings: We discussed the expected course, resolution and complications of the diagnosis(es) in detail. Medication risks, benefits, costs, interactions, and alternatives were discussed as indicated. I advised her to contact the office if her condition worsens, changes or fails to improve as anticipated. She expressed understanding with the diagnosis(es) and plan. RICKEY Silva, was evaluated through a synchronous (real-time) audio-video encounter. The patient (or guardian if applicable) is aware that this is a billable service, which includes applicable co-pays. Verbal consent to proceed has been obtained. The visit was conducted pursuant to the emergency declaration under the [facility], [address] [place] [address] authority and the [employer] and [employer] Act. Patient identification was verified, and a caregiver was present when appropriate. The patient was located at home in a state where the provider was licensed to provide care. [place]BBIE Reed[place] [address] [place] [address].   Rosales Mcclure

## 2023-03-23 ENCOUNTER — OFFICE VISIT (OUTPATIENT)
Facility: CLINIC | Age: 28
End: 2023-03-23
Payer: MEDICAID

## 2023-03-23 VITALS
WEIGHT: 128.3 LBS | HEART RATE: 91 BPM | OXYGEN SATURATION: 100 % | HEIGHT: 64 IN | BODY MASS INDEX: 21.91 KG/M2 | RESPIRATION RATE: 18 BRPM | DIASTOLIC BLOOD PRESSURE: 71 MMHG | TEMPERATURE: 97 F | SYSTOLIC BLOOD PRESSURE: 119 MMHG

## 2023-03-23 DIAGNOSIS — Z00.00 WELL WOMAN EXAM (NO GYNECOLOGICAL EXAM): Primary | ICD-10-CM

## 2023-03-23 DIAGNOSIS — Z11.59 NEED FOR HEPATITIS C SCREENING TEST: ICD-10-CM

## 2023-03-23 PROBLEM — Z92.89 HISTORY OF POSITIVE PPD: Status: ACTIVE | Noted: 2023-03-23

## 2023-03-23 PROCEDURE — 99395 PREV VISIT EST AGE 18-39: CPT | Performed by: NURSE PRACTITIONER

## 2023-03-23 SDOH — ECONOMIC STABILITY: HOUSING INSECURITY
IN THE LAST 12 MONTHS, WAS THERE A TIME WHEN YOU DID NOT HAVE A STEADY PLACE TO SLEEP OR SLEPT IN A SHELTER (INCLUDING NOW)?: NO

## 2023-03-23 SDOH — ECONOMIC STABILITY: TRANSPORTATION INSECURITY
IN THE PAST 12 MONTHS, HAS THE LACK OF TRANSPORTATION KEPT YOU FROM MEDICAL APPOINTMENTS OR FROM GETTING MEDICATIONS?: NO

## 2023-03-23 SDOH — ECONOMIC STABILITY: FOOD INSECURITY: WITHIN THE PAST 12 MONTHS, THE FOOD YOU BOUGHT JUST DIDN'T LAST AND YOU DIDN'T HAVE MONEY TO GET MORE.: NEVER TRUE

## 2023-03-23 SDOH — ECONOMIC STABILITY: FOOD INSECURITY: WITHIN THE PAST 12 MONTHS, YOU WORRIED THAT YOUR FOOD WOULD RUN OUT BEFORE YOU GOT MONEY TO BUY MORE.: NEVER TRUE

## 2023-03-23 SDOH — ECONOMIC STABILITY: INCOME INSECURITY: IN THE LAST 12 MONTHS, WAS THERE A TIME WHEN YOU WERE NOT ABLE TO PAY THE MORTGAGE OR RENT ON TIME?: NO

## 2023-03-23 SDOH — ECONOMIC STABILITY: TRANSPORTATION INSECURITY
IN THE PAST 12 MONTHS, HAS LACK OF TRANSPORTATION KEPT YOU FROM MEETINGS, WORK, OR FROM GETTING THINGS NEEDED FOR DAILY LIVING?: NO

## 2023-03-23 ASSESSMENT — PATIENT HEALTH QUESTIONNAIRE - PHQ9
SUM OF ALL RESPONSES TO PHQ QUESTIONS 1-9: 0
2. FEELING DOWN, DEPRESSED OR HOPELESS: 0
SUM OF ALL RESPONSES TO PHQ QUESTIONS 1-9: 0
SUM OF ALL RESPONSES TO PHQ QUESTIONS 1-9: 0
SUM OF ALL RESPONSES TO PHQ9 QUESTIONS 1 & 2: 0
SUM OF ALL RESPONSES TO PHQ QUESTIONS 1-9: 0
1. LITTLE INTEREST OR PLEASURE IN DOING THINGS: 0

## 2023-03-23 ASSESSMENT — SOCIAL DETERMINANTS OF HEALTH (SDOH): HOW HARD IS IT FOR YOU TO PAY FOR THE VERY BASICS LIKE FOOD, HOUSING, MEDICAL CARE, AND HEATING?: NOT HARD AT ALL

## 2023-03-23 ASSESSMENT — ENCOUNTER SYMPTOMS
RESPIRATORY NEGATIVE: 1
EYES NEGATIVE: 1
GASTROINTESTINAL NEGATIVE: 1

## 2023-03-23 NOTE — PROGRESS NOTES
hurting yourself in some way -   PHQ-2 Score 0   Total Score PHQ 2 -   PHQ-9 Total Score 0   PHQ 9 Score -          Health Maintenance Due   Topic Date Due    Varicella vaccine (1 of 2 - 2-dose childhood series) Never done    HIV screen  Never done    Hepatitis C screen  Never done    COVID-19 Vaccine (3 - Booster for Pfizer series) 08/05/2021    Flu vaccine (1) 08/01/2022    Pap smear  10/24/2022         Who is the primary learner? Patient    What is the preferred language for health care of the primary learner? ENGLISH    How does the primary learner prefer to learn new concepts?  DEMONSTRATION    Answered By Patient    Relationship to Learner SELF
patient's questions have been answered. Donny Alexander, CURRYP-BC  [facility]   [address] [facility] [address]  [phone]

## 2023-03-24 LAB
A/G RATIO: 2.1 RATIO (ref 1.1–2.6)
ALBUMIN SERPL-MCNC: 4.5 G/DL (ref 3.5–5)
ALP BLD-CCNC: 30 U/L (ref 25–115)
ALT SERPL-CCNC: 9 U/L (ref 5–40)
ANION GAP SERPL CALCULATED.3IONS-SCNC: 8 MMOL/L (ref 3–15)
AST SERPL-CCNC: 11 U/L (ref 10–37)
BILIRUB SERPL-MCNC: 0.3 MG/DL (ref 0.2–1.2)
BUN BLDV-MCNC: 11 MG/DL (ref 6–22)
CALCIUM SERPL-MCNC: 9.5 MG/DL (ref 8.4–10.5)
CHLORIDE BLD-SCNC: 105 MMOL/L (ref 98–110)
CO2: 28 MMOL/L (ref 20–32)
CREAT SERPL-MCNC: 0.7 MG/DL (ref 0.5–1.2)
GLOBULIN: 2.1 G/DL (ref 2–4)
GLOMERULAR FILTRATION RATE: >60 ML/MIN/1.73 SQ.M.
GLUCOSE: 88 MG/DL (ref 70–99)
HEPATITIS C ANTIBODY: NORMAL
POTASSIUM SERPL-SCNC: 3.7 MMOL/L (ref 3.5–5.5)
SODIUM BLD-SCNC: 141 MMOL/L (ref 133–145)
TOTAL PROTEIN: 6.6 G/DL (ref 6.4–8.3)

## 2024-05-21 ENCOUNTER — OFFICE VISIT (OUTPATIENT)
Facility: CLINIC | Age: 29
End: 2024-05-21
Payer: MEDICAID

## 2024-05-21 VITALS
TEMPERATURE: 98.5 F | OXYGEN SATURATION: 97 % | RESPIRATION RATE: 18 BRPM | BODY MASS INDEX: 20.49 KG/M2 | HEIGHT: 64 IN | SYSTOLIC BLOOD PRESSURE: 119 MMHG | WEIGHT: 120 LBS | DIASTOLIC BLOOD PRESSURE: 83 MMHG | HEART RATE: 107 BPM

## 2024-05-21 DIAGNOSIS — Z00.00 WELL WOMAN EXAM (NO GYNECOLOGICAL EXAM): Primary | ICD-10-CM

## 2024-05-21 PROCEDURE — 99395 PREV VISIT EST AGE 18-39: CPT | Performed by: NURSE PRACTITIONER

## 2024-05-21 RX ORDER — TRETINOIN 0.5 MG/G
CREAM TOPICAL
COMMUNITY
Start: 2024-03-30

## 2024-05-21 SDOH — ECONOMIC STABILITY: INCOME INSECURITY: HOW HARD IS IT FOR YOU TO PAY FOR THE VERY BASICS LIKE FOOD, HOUSING, MEDICAL CARE, AND HEATING?: NOT HARD AT ALL

## 2024-05-21 SDOH — ECONOMIC STABILITY: FOOD INSECURITY: WITHIN THE PAST 12 MONTHS, YOU WORRIED THAT YOUR FOOD WOULD RUN OUT BEFORE YOU GOT MONEY TO BUY MORE.: NEVER TRUE

## 2024-05-21 SDOH — ECONOMIC STABILITY: FOOD INSECURITY: WITHIN THE PAST 12 MONTHS, THE FOOD YOU BOUGHT JUST DIDN'T LAST AND YOU DIDN'T HAVE MONEY TO GET MORE.: NEVER TRUE

## 2024-05-21 ASSESSMENT — PATIENT HEALTH QUESTIONNAIRE - PHQ9
2. FEELING DOWN, DEPRESSED OR HOPELESS: NOT AT ALL
1. LITTLE INTEREST OR PLEASURE IN DOING THINGS: NOT AT ALL
SUM OF ALL RESPONSES TO PHQ QUESTIONS 1-9: 0
SUM OF ALL RESPONSES TO PHQ9 QUESTIONS 1 & 2: 0

## 2024-05-21 NOTE — PROGRESS NOTES
[address] [place] [address]               [phone]      Dalia Christian is a 29 y.o. female and presents with Annual Exam       Assessment/Plan:    1. Well woman exam (no gynecological exam)  -     Comprehensive Metabolic Panel; Future  -     CBC; Future  Annual physical completed today  Routine labs today  Patient verbalized understanding and is in agreement with this plan of care         Follow up and disposition:   Return in about 1 year (around 5/21/2025) for Physical, w/ogyn, 30min, office.      Subjective:    Labs obtained prior to visit? No  Reviewed with patient? N/A    Visit today for annual physical    ROS:     Review of Systems   Constitutional: Negative.    HENT: Negative.     Eyes: Negative.    Respiratory: Negative.     Cardiovascular: Negative.    Gastrointestinal: Negative.    Genitourinary: Negative.    Musculoskeletal:         Recent fx of fifth metatarsal on left foot, in a cast boot and using crutches   Skin: Negative.    Neurological: Negative.    Psychiatric/Behavioral: Negative.           The problem list was updated as a part of today's visit.  Patient Active Problem List   Diagnosis    History of positive PPD       The PSH, FH were reviewed.      SH:  Social History     Tobacco Use    Smoking status: Never    Smokeless tobacco: Never   Substance Use Topics    Alcohol use: Yes    Drug use: No       Medications/Allergies:  Current Outpatient Medications on File Prior to Visit   Medication Sig Dispense Refill    levonorgestrel (MIRENA) IUD 52 mg 1 Device by IntraUTERine route once       No current facility-administered medications on file prior to visit.        No Known Allergies    Objective:  /83 Comment: feet flat on floor  Pulse (!) 107   Temp 98.5 °F (36.9 °C) (Temporal)   Resp 18   Ht 1.626 m (5' 4\")   Wt 54.4 kg (120 lb)   SpO2 97%   BMI 20.60 kg/m²  Body mass index is 20.6 kg/m².    Physical assessment  Physical

## 2024-05-21 NOTE — PROGRESS NOTES
Room 7    Dalia Christian had concerns including Annual Exam. for today's visit .     When asked if patient has any concerns she would like to address with JANNA Burns patient states I would like a CBC . JANNA Burns has been notified  of patient concerns .    Did patient bring someone? No    Did the patient have DME equipment? Yes cane     Did you take your medication today? Yes       1. \"Have you been to the ER, urgent care clinic since your last visit?  Hospitalized since your last visit?\" .NO    2. \"Have you seen or consulted any other health care providers outside of the [facility] since your last visit?\" No     3. For patients aged 45-75: Has the patient had a colonoscopy / FIT/ Cologuard? N/A      If the patient is female:    4. For patients aged 40-74: Has the patient had a mammogram within the past 2 years? N/A      5. For patients aged 21-65: Has the patient had a pap smear? {Cancer Care Gap present? Yes          5/21/2024    12:46 PM   PHQ-9    Little interest or pleasure in doing things 0   Feeling down, depressed, or hopeless 0   PHQ-2 Score 0   PHQ-9 Total Score 0          Health Maintenance Due   Topic Date Due    Hepatitis B vaccine (1 of 3 - 3-dose series) Never done    Varicella vaccine (1 of 2 - 2-dose childhood series) Never done    COVID-19 Vaccine (3 - 2023-24 season) 09/01/2023    DTaP/Tdap/Td vaccine (2 - Td or Tdap) 03/20/2024    Depression Screen  03/23/2024

## 2024-05-22 LAB
A/G RATIO: 2 RATIO (ref 1.1–2.6)
ALBUMIN: 4.7 G/DL (ref 3.5–5)
ALP BLD-CCNC: 28 U/L (ref 25–115)
ALT SERPL-CCNC: 10 U/L (ref 5–40)
ANION GAP SERPL CALCULATED.3IONS-SCNC: 8 MMOL/L (ref 3–15)
AST SERPL-CCNC: 10 U/L (ref 10–37)
BILIRUB SERPL-MCNC: 0.4 MG/DL (ref 0.2–1.2)
BUN BLDV-MCNC: 13 MG/DL (ref 6–22)
CALCIUM SERPL-MCNC: 9.4 MG/DL (ref 8.4–10.5)
CHLORIDE BLD-SCNC: 103 MMOL/L (ref 98–110)
CO2: 29 MMOL/L (ref 20–32)
CREAT SERPL-MCNC: 0.9 MG/DL (ref 0.5–1.2)
GFR, ESTIMATED: >60 ML/MIN/1.73 SQ.M.
GLOBULIN: 2.4 G/DL (ref 2–4)
GLUCOSE: 92 MG/DL (ref 70–99)
HCT VFR BLD CALC: 35.9 % (ref 35.1–46.5)
HEMOGLOBIN: 12.1 G/DL (ref 11.7–15.5)
MCH RBC QN AUTO: 33 PG (ref 26–34)
MCHC RBC AUTO-ENTMCNC: 34 G/DL (ref 31–36)
MCV RBC AUTO: 97 FL (ref 80–99)
PDW BLD-RTO: 12.1 % (ref 10–15.5)
PLATELET # BLD: 232 K/UL (ref 140–440)
PMV BLD AUTO: 10.2 FL (ref 9–13)
POTASSIUM SERPL-SCNC: 4.1 MMOL/L (ref 3.5–5.5)
RBC # BLD: 3.72 M/UL (ref 3.8–5.2)
SODIUM BLD-SCNC: 140 MMOL/L (ref 133–145)
TOTAL PROTEIN: 7.1 G/DL (ref 6.4–8.3)
WBC # BLD: 4.3 K/UL (ref 4–11)